# Patient Record
Sex: MALE | HISPANIC OR LATINO | URBAN - METROPOLITAN AREA
[De-identification: names, ages, dates, MRNs, and addresses within clinical notes are randomized per-mention and may not be internally consistent; named-entity substitution may affect disease eponyms.]

---

## 2021-10-19 ENCOUNTER — OFFICE VISIT (OUTPATIENT)
Dept: URBAN - METROPOLITAN AREA CLINIC 48 | Facility: CLINIC | Age: 76
End: 2021-10-19
Payer: COMMERCIAL

## 2021-10-19 DIAGNOSIS — H25.11 AGE-RELATED NUCLEAR CATARACT, RIGHT EYE: ICD-10-CM

## 2021-10-19 DIAGNOSIS — H35.3131 NONEXUDATIVE MACULAR DEGENERATION, EARLY DRY STAGE, BILATERAL: ICD-10-CM

## 2021-10-19 PROCEDURE — 92134 CPTRZ OPH DX IMG PST SGM RTA: CPT | Performed by: STUDENT IN AN ORGANIZED HEALTH CARE EDUCATION/TRAINING PROGRAM

## 2021-10-19 PROCEDURE — 99204 OFFICE O/P NEW MOD 45 MIN: CPT | Performed by: STUDENT IN AN ORGANIZED HEALTH CARE EDUCATION/TRAINING PROGRAM

## 2021-10-19 ASSESSMENT — VISUAL ACUITY
OS: 20/30
OD: 20/25

## 2021-10-19 ASSESSMENT — KERATOMETRY
OS: 43.13
OD: 43.13

## 2021-10-19 ASSESSMENT — INTRAOCULAR PRESSURE
OD: 10
OS: 8

## 2021-10-19 NOTE — IMPRESSION/PLAN
Impression: Nonexudative macular degeneration, early dry stage, bilateral: H35.3423. Plan: Disc. w/pt. continue to monitor w/DFE and OCT MAC in 6 months. Patient to continue healthy diet increase in vegetables, OTC AREDS vitamins and AMSLER grid. Patient understands to call clinic if  any changes in AMSLER grid or other vision changes.  Patient to be seen same day if they call

## 2021-10-19 NOTE — IMPRESSION/PLAN
Impression: Combined forms of age-related cataract, left eye: H25.812. Plan: The patient has a visually significant cataract in the left eye. After discussion with the patient and careful examination it has been determined that a cataract in the left eye is accounting for a significant amount of patient's visual symptoms. Cataract surgery and the associated risks, benefits, alternatives, expectations, and recovery were discussed in detail with the patient. All questions were answered. The patient understands that there may be some limitation in visual potential given any pre-existing ocular disease. Will be using Dexycu injection, NO ERX drops. Discussed benefits of injection with patient and advised of possible floaters for 4-6weeks post surgery. Schedule Cataract Surgery LEFT eye. RL 3
GOOD dilation, NO previous LASIK surgery, Use of alpha blockers, plan for Phenylephrine. 
Discussed lens options with patient, patient candidate for STANDARD vs TORRIC Lens due to AMD.

## 2021-10-19 NOTE — IMPRESSION/PLAN
Impression: Age-related nuclear cataract, right eye: H25.11. Plan: Cataract is visually significant but not does not qualify for surgery to be covered by insurance. Continue to monitor for vision changes.

## 2021-11-22 ENCOUNTER — TESTING ONLY (OUTPATIENT)
Dept: URBAN - METROPOLITAN AREA CLINIC 48 | Facility: CLINIC | Age: 76
End: 2021-11-22
Payer: COMMERCIAL

## 2021-11-22 DIAGNOSIS — H25.812 COMBINED FORMS OF AGE-RELATED CATARACT, LEFT EYE: Primary | ICD-10-CM

## 2021-11-22 ASSESSMENT — PACHYMETRY
OD: 2.86
OS: 2.89
OS: 23.44
OD: 23.27

## 2021-11-22 ASSESSMENT — KERATOMETRY
OD: 42.99
OS: 43.16

## 2021-12-01 ENCOUNTER — SURGERY (OUTPATIENT)
Dept: URBAN - METROPOLITAN AREA SURGERY 26 | Facility: SURGERY | Age: 76
End: 2021-12-01
Payer: COMMERCIAL

## 2021-12-01 PROCEDURE — 66984 XCAPSL CTRC RMVL W/O ECP: CPT | Performed by: STUDENT IN AN ORGANIZED HEALTH CARE EDUCATION/TRAINING PROGRAM

## 2021-12-02 ENCOUNTER — POST-OPERATIVE VISIT (OUTPATIENT)
Dept: URBAN - METROPOLITAN AREA CLINIC 48 | Facility: CLINIC | Age: 76
End: 2021-12-02
Payer: COMMERCIAL

## 2021-12-02 PROCEDURE — 99024 POSTOP FOLLOW-UP VISIT: CPT | Performed by: OPHTHALMOLOGY

## 2021-12-02 ASSESSMENT — INTRAOCULAR PRESSURE: OS: 13

## 2021-12-02 NOTE — IMPRESSION/PLAN
Impression: S/P Cataract Extraction by phacoemulsification with IOL placement OS - 1 Day. Encounter for surgical aftercare following surgery on a sense organ  Z48.810.  Post operative instructions reviewed - Condition is improving - Plan: RTC  1 week PO 2

## 2021-12-08 ENCOUNTER — POST-OPERATIVE VISIT (OUTPATIENT)
Dept: URBAN - METROPOLITAN AREA CLINIC 48 | Facility: CLINIC | Age: 76
End: 2021-12-08
Payer: COMMERCIAL

## 2021-12-08 DIAGNOSIS — Z48.810 ENCOUNTER FOR SURGICAL AFTERCARE FOLLOWING SURGERY ON A SENSE ORGAN: Primary | ICD-10-CM

## 2021-12-08 PROCEDURE — 99024 POSTOP FOLLOW-UP VISIT: CPT | Performed by: STUDENT IN AN ORGANIZED HEALTH CARE EDUCATION/TRAINING PROGRAM

## 2021-12-08 ASSESSMENT — INTRAOCULAR PRESSURE: OS: 6

## 2021-12-08 NOTE — IMPRESSION/PLAN
Impression: S/P Cataract Extraction by phacoemulsification with IOL placement OS - 7 Days. Encounter for surgical aftercare following surgery on a sense organ  Z48.810. Plan: - Eye is doing well, pt. to hold off on new rx glasses until after PO3. 
- RT/RD precautions reviewed with the patient
- Can resume normal activities with the exception of avoiding swimming for 1 more week RTC 3-4 wks.  for PO3

## 2022-01-05 ENCOUNTER — POST-OPERATIVE VISIT (OUTPATIENT)
Dept: URBAN - METROPOLITAN AREA CLINIC 48 | Facility: CLINIC | Age: 77
End: 2022-01-05
Payer: COMMERCIAL

## 2022-01-05 PROCEDURE — 99024 POSTOP FOLLOW-UP VISIT: CPT | Performed by: STUDENT IN AN ORGANIZED HEALTH CARE EDUCATION/TRAINING PROGRAM

## 2022-01-05 ASSESSMENT — INTRAOCULAR PRESSURE
OD: 10
OS: 10

## 2022-01-05 NOTE — IMPRESSION/PLAN
Impression: S/P Cataract Extraction by phacoemulsification with IOL placement OS - 35 Days. Encounter for surgical aftercare following surgery on a sense organ  Z48.810. Post operative instructions reviewed - Condition is improving - Patient may get new prescription glasses if patient desires.  Plan: RTC 6 months DFE or PRN

## 2023-11-30 ENCOUNTER — OFFICE VISIT (OUTPATIENT)
Dept: URBAN - METROPOLITAN AREA CLINIC 48 | Facility: CLINIC | Age: 78
End: 2023-11-30
Payer: MEDICARE

## 2023-11-30 DIAGNOSIS — H35.3191 NONEXUDATIVE AGE-RELATED MACULAR DEGENERATION, EARLY DRY STAGE: ICD-10-CM

## 2023-11-30 DIAGNOSIS — H25.11 AGE-RELATED NUCLEAR CATARACT, RIGHT EYE: ICD-10-CM

## 2023-11-30 DIAGNOSIS — H04.123 DRY EYE SYNDROME OF BILATERAL LACRIMAL GLANDS: Primary | ICD-10-CM

## 2023-11-30 DIAGNOSIS — H26.492 OTHER SECONDARY CATARACT, LEFT EYE: ICD-10-CM

## 2023-11-30 PROCEDURE — 92134 CPTRZ OPH DX IMG PST SGM RTA: CPT | Performed by: STUDENT IN AN ORGANIZED HEALTH CARE EDUCATION/TRAINING PROGRAM

## 2023-11-30 PROCEDURE — 99214 OFFICE O/P EST MOD 30 MIN: CPT | Performed by: STUDENT IN AN ORGANIZED HEALTH CARE EDUCATION/TRAINING PROGRAM

## 2023-11-30 ASSESSMENT — INTRAOCULAR PRESSURE
OD: 12
OS: 12

## 2023-11-30 ASSESSMENT — VISUAL ACUITY
OD: 20/40
OS: 20/40

## 2023-12-29 ENCOUNTER — OFFICE VISIT (OUTPATIENT)
Dept: URBAN - METROPOLITAN AREA CLINIC 48 | Facility: CLINIC | Age: 78
End: 2023-12-29
Payer: MEDICARE

## 2023-12-29 PROCEDURE — 99213 OFFICE O/P EST LOW 20 MIN: CPT | Performed by: STUDENT IN AN ORGANIZED HEALTH CARE EDUCATION/TRAINING PROGRAM

## 2023-12-29 ASSESSMENT — KERATOMETRY: OS: 43.13

## 2023-12-29 ASSESSMENT — VISUAL ACUITY: OS: 20/30

## 2023-12-29 ASSESSMENT — INTRAOCULAR PRESSURE
OD: 9
OS: 9

## 2024-02-26 ENCOUNTER — OFFICE VISIT (OUTPATIENT)
Dept: URBAN - METROPOLITAN AREA CLINIC 48 | Facility: CLINIC | Age: 79
End: 2024-02-26
Payer: MEDICARE

## 2024-02-26 DIAGNOSIS — H04.123 DRY EYE SYNDROME OF BILATERAL LACRIMAL GLANDS: Primary | ICD-10-CM

## 2024-02-26 PROCEDURE — 99213 OFFICE O/P EST LOW 20 MIN: CPT | Performed by: STUDENT IN AN ORGANIZED HEALTH CARE EDUCATION/TRAINING PROGRAM

## 2024-02-26 ASSESSMENT — INTRAOCULAR PRESSURE
OD: 13
OS: 10

## 2024-04-29 ENCOUNTER — Encounter (OUTPATIENT)
Dept: URBAN - METROPOLITAN AREA SURGERY 25 | Facility: SURGERY | Age: 79
End: 2024-04-29
Payer: MEDICARE

## 2024-04-29 ENCOUNTER — LASER (OUTPATIENT)
Dept: URBAN - METROPOLITAN AREA SURGERY 24 | Facility: SURGERY | Age: 79
End: 2024-04-29
Payer: MEDICARE

## 2024-04-29 PROCEDURE — 66821 AFTER CATARACT LASER SURGERY: CPT | Performed by: STUDENT IN AN ORGANIZED HEALTH CARE EDUCATION/TRAINING PROGRAM

## 2024-06-03 ENCOUNTER — OFFICE VISIT (OUTPATIENT)
Dept: URBAN - METROPOLITAN AREA CLINIC 48 | Facility: CLINIC | Age: 79
End: 2024-06-03
Payer: MEDICARE

## 2024-06-03 DIAGNOSIS — H35.3191 NONEXUDATIVE AGE-RELATED MACULAR DEGENERATION, EARLY DRY STAGE: Primary | ICD-10-CM

## 2024-06-03 DIAGNOSIS — H25.11 AGE-RELATED NUCLEAR CATARACT, RIGHT EYE: ICD-10-CM

## 2024-06-03 PROCEDURE — 99204 OFFICE O/P NEW MOD 45 MIN: CPT | Performed by: OPTOMETRIST

## 2024-06-03 ASSESSMENT — INTRAOCULAR PRESSURE
OS: 12
OD: 10